# Patient Record
(demographics unavailable — no encounter records)

---

## 2025-07-02 NOTE — PHYSICAL EXAM
[Normal Breath Sounds] : Normal breath sounds [Normal Heart Sounds] : normal heart sounds [Normal Rate and Rhythm] : normal rate and rhythm [No Rash or Lesion] : No rash or lesion [Alert] : alert [Oriented to Person] : oriented to person [Oriented to Place] : oriented to place [Oriented to Time] : oriented to time [Calm] : calm [Exam Deferred] : exam was deferred [Posterior] : posteriorly [Excoriation] : no perianal excoriation [Multiple Sinus Tracts] : no perianal sinus tracts [Fistula] : no fistulas [Wart] : no warts [Ulcer ___ cm] : no ulcers [Pilonidal Cyst] : no pilonidal cysts [Pilonidal Sinus] : no pilonidal sinus [Pilonidal Sinus Draining] : no pilonidal sinus drainage [Tender, Swollen] : nontender, non-swollen [Normal] : was normal [JVD] : no jugular venous distention  [Thyroid] : the thyroid was abnormal [Wheezing] : no wheezing was heard [Purpura] : no purpura  [Petechiae] : no petechiae [Skin Ulcer] : no ulcer [Skin Induration] : no induration [de-identified] : soft, NT [de-identified] : Posterior midline chronic anal fissure. [de-identified] : NAD [de-identified] : NC/AT [de-identified] : +ROM [de-identified] : intact

## 2025-07-02 NOTE — HISTORY OF PRESENT ILLNESS
[FreeTextEntry1] : 50 y/o male here for evaluation of rectal pain.   Pt reports that around 2-3 weeks ago, he started experiencing rectal pain at night after having a BM. He states that the pain lasts around 4-5 hours. His PCP gave him hydrocortisone topical and anicare, which hasn't helped.  Pt denies abdominal pain, n/v, constipation, diarrhea, and bleeding.  Last colonoscopy was 2019- hx benign polyps

## 2025-07-02 NOTE — ASSESSMENT
[FreeTextEntry1] : 51-year-old male with chronic posterior midline anal fissure.  Will perform a trial of nifedipine cream.  Based on the appearance and chronicity of his fissure I anticipate him needing a fissurectomy.  Plan:  Follow-up in 1 week Trial of nifedipine cream Schedule examination under anesthesia Botox chemical denervation of internal sphincter with fissurectomy.

## 2025-07-21 NOTE — ASSESSMENT
[FreeTextEntry1] : 51-year-old male status post fissurectomy recovering well.  Plan:  Follow-up in 1 month if needed.

## 2025-07-21 NOTE — HISTORY OF PRESENT ILLNESS
[FreeTextEntry1] : 51-year-old male status post Botox chemical denervation of internal sphincter and fissurectomy recovering well.  He notes occasional spotting of blood.